# Patient Record
Sex: FEMALE | Race: WHITE
[De-identification: names, ages, dates, MRNs, and addresses within clinical notes are randomized per-mention and may not be internally consistent; named-entity substitution may affect disease eponyms.]

---

## 2017-06-24 ENCOUNTER — HOSPITAL ENCOUNTER (EMERGENCY)
Dept: HOSPITAL 45 - C.EDB | Age: 82
Discharge: HOME | End: 2017-06-24
Payer: COMMERCIAL

## 2017-06-24 VITALS
BODY MASS INDEX: 16.01 KG/M2 | HEIGHT: 57.99 IN | HEIGHT: 57.99 IN | WEIGHT: 76.28 LBS | WEIGHT: 76.28 LBS | BODY MASS INDEX: 16.01 KG/M2

## 2017-06-24 VITALS
TEMPERATURE: 98.24 F | DIASTOLIC BLOOD PRESSURE: 114 MMHG | SYSTOLIC BLOOD PRESSURE: 204 MMHG | OXYGEN SATURATION: 95 % | HEART RATE: 92 BPM

## 2017-06-24 DIAGNOSIS — S01.81XA: Primary | ICD-10-CM
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
## 2017-08-16 ENCOUNTER — HOSPITAL ENCOUNTER (OUTPATIENT)
Dept: HOSPITAL 45 - C.RDSM | Age: 82
Discharge: HOME | End: 2017-08-16
Attending: FAMILY MEDICINE
Payer: COMMERCIAL

## 2017-08-16 DIAGNOSIS — M85.811: ICD-10-CM

## 2017-08-16 DIAGNOSIS — S43.001A: ICD-10-CM

## 2017-08-16 DIAGNOSIS — M25.512: Primary | ICD-10-CM
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
## 2017-08-16 NOTE — DIAGNOSTIC IMAGING REPORT
RIGHT SHOULDER 3 VIEWS



CLINICAL HISTORY: Right shoulder pain.



FINDINGS: 3 views of the right shoulder are obtained. No prior studies are

available for comparison at the time of dictation. The skeletal structures are

osteopenic. No fracture is seen. Productive degenerative change with bony

overgrowth is seen at the acromioclavicular joint. There is arthritic change at

the glenohumeral articulation with superior subluxation of the humeral head.

Large spurs arise from the humeral head. Soft tissue swelling is noted overlying

the shoulder, greatest over the acromioclavicular joint. The partially imaged

right upper lobe lung parenchyma appears clear. There is atherosclerotic

calcification of the thoracic aorta. Surgical clips are noted in the neck.



IMPRESSION:



1. Osteopenia and arthritic change with no acute bony abnormality identified in

the right shoulder.



2. There is superior subluxation of the humeral head suggesting chronic rotator

cuff injury.



3. Productive degenerative change is seen at the acromioclavicular joint with

overlying soft tissue edema.







Electronically signed by:  Vamsi Jung M.D.

8/16/2017 10:01 AM



Dictated Date/Time:  8/16/2017 9:59 AM

## 2017-08-16 NOTE — DIAGNOSTIC IMAGING REPORT
LEFT SHOULDER MIN 2 VIEWS



CLINICAL HISTORY: Left shoulder pain  PALPABLE LUMP NEAR THE AC JOINT.



COMPARISON: None.



DISCUSSION: No acute fractures or dislocations are visualized. The bones are

osteopenic. There is minor spurring at the level of the greater tuberosity.

There there is a 2 cm lucency within the humeral head with sclerotic margins.

This has a nonaggressive appearance. Are surgical clips within the left neck.   





IMPRESSION: 

1. No fractures or dislocations identified.

2. 2 cm humeral head lucency with a nonaggressive appearance







Electronically signed by:  Sergio Burnett M.D.

8/16/2017 10:00 AM



Dictated Date/Time:  8/16/2017 9:57 AM

## 2018-08-10 ENCOUNTER — HOSPITAL ENCOUNTER (EMERGENCY)
Dept: HOSPITAL 45 - C.EDB | Age: 83
Discharge: HOME | End: 2018-08-10
Payer: COMMERCIAL

## 2018-08-10 VITALS
HEIGHT: 57.99 IN | BODY MASS INDEX: 15.27 KG/M2 | WEIGHT: 72.75 LBS | WEIGHT: 72.75 LBS | BODY MASS INDEX: 15.27 KG/M2 | HEIGHT: 57.99 IN

## 2018-08-10 VITALS — DIASTOLIC BLOOD PRESSURE: 95 MMHG | HEART RATE: 80 BPM | SYSTOLIC BLOOD PRESSURE: 166 MMHG | OXYGEN SATURATION: 98 %

## 2018-08-10 VITALS — TEMPERATURE: 98.6 F

## 2018-08-10 DIAGNOSIS — R03.0: ICD-10-CM

## 2018-08-10 DIAGNOSIS — R04.0: Primary | ICD-10-CM

## 2018-08-10 NOTE — EMERGENCY ROOM VISIT NOTE
ED Visit Note


First contact with patient:  21:32


I have seen and examined this patient with Hue Tinoco and generally agree with 

the treatment plan as discussed.


Problem List


Medical Problems:


(1) Arthritis


Status: Chronic  











Current/Historical Medications


No Active Prescriptions or Reported Meds





Allergies


Coded Allergies:  


     No Known Allergies (Unverified , 6/24/17)





Vital Signs











  Date Time  Temp Pulse Resp B/P (MAP) Pulse Ox O2 Delivery O2 Flow Rate FiO2


 


8/10/18 21:27 37.0 97 18 207/111 98 Room Air  











Departure Information


Prescriptions





No Active Prescriptions or Reported Meds





Referrals


Hong Lee M.D. (PCP)





Patient Instructions


My [facility]

## 2018-08-11 NOTE — EMERGENCY ROOM VISIT NOTE
History


First contact with patient:  21:32


Chief Complaint:  NOSE BLEED (MINOR)


Stated Complaint:  NOSE BLEED





History of Present Illness


The patient is a 83 year old female who presents to the Emergency Room with 

complaints of nosebleed for the past few hours.  Patient states she has tried 

Vaseline, lavender oil and vinegar to help stop the nosebleed.  Patient states 

in the past this has helped.  Patient denies injury to the area, headache, 

chest pain, dyspnea, weakness, lightheadedness, dizziness, bleeding problems.  

No other concerns per patient.  Patient states he is healthy, active and has no 

active medical problems.





Review of Systems


An 10 system review of systems was completed with positives and pertinent 

negatives listed in the HPI.





Past Medical/Surgical History


Medical Problems:


(1) Arthritis








Social History


Smoking Status:  Never Smoker


Alcohol Use:  none


Marital Status:  


Housing Status:  lives with significant other


Occupation Status:  retired





Current/Historical Medications


No Active Prescriptions or Reported Meds





Physical Exam


Vital Signs











  Date Time  Temp Pulse Resp B/P (MAP) Pulse Ox O2 Delivery O2 Flow Rate FiO2


 


8/10/18 22:40  80 18 166/95 98   


 


8/10/18 22:23    166/95    


 


8/10/18 21:27 37.0 97 18 207/111 98 Room Air  











Physical Exam


VITALS: Vitals are noted on the nurse's note and reviewed by myself.  Vital 

signs hypertensive.


GENERAL: Pleasant elderly female holding her nose, in no acute distress, 

nondiaphoretic, well-developed well-nourished.


SKIN: The skin was without rashes, erythema, edema, or bruising.  There is no 

tenting of the skin.  Capillary reflex less than 2 seconds.


HEAD: Normocephalic atraumatic.  


EARS: External auditory canals clear, tympanic membranes pearly gray without 

erythema or effusion bilaterally.


EYES: Pupils equal round and reactive to light and accommodation.  Conjunctivae 

without injection, sclerae without icterus.  Extraocular movements intact.  


NOSE: Patent, active bleeding from the right Kiesselbach plexus area, no 

bleeding in the left nare.  No septal hematoma.  No sinus tenderness.


MOUTH: Mucous membranes moist.   Pharynx without erythema or exudate.  Uvula 

midline.  Airway patent.  Tongue does not deviate.  


NECK: Supple without nuchal rigidity.  No lymphadenopathy.  No thyromegaly.  

Cervical spine is nontender.  No JVD.


HEART: Regular rate and rhythm 


LUNGS: Clear to auscultation bilaterally without wheezes, rales or rhonchi.  No 

retractions or accessory muscle use.


ABDOMEN: Positive bowel sounds x 4.  Normal tympanic percussion.  Soft, 

nontender, without masses or organomegaly.  Jo sign negative.  No guarding 

or rebound tenderness. No CVA tenderness


MUSCULOSKELETAL: No muscle atrophy, erythema, or edema noted.  


NEURO: Patient was alert and oriented to person place and time.  Normal 

sensation to light and sharp touch.     No focal neurological deficits.





Medical Decision & Procedures


Medications Administered











 Medications


  (Trade)  Dose


 Ordered  Sig/Chuckie


 Route  Start Time


 Stop Time Status Last Admin


Dose Admin


 


 Oxymetazoline HCl


  (Afrin 0.05%


 Nasal Spray)  1 sprays  NOW  ONCE


 NA  8/10/18 21:45


 8/10/18 21:46 DC 8/10/18 21:50


1 SPRAYS











ED Course


Prior records/ancillary studies reviewed.


Triage Nursing notes reviewed.


Additional history obtained from family.





The patient's history was concerning for epistaxis.





Differential diagnosis:





Etiologies such as anterior epistaxis, coagulopathy, traumatic injury, fracture

, septal hematoma, posterior epistaxis as well as other pathologies were 

entertained.





Physical examination findings:


As above. Anterior bleeding source.





ER treatment provided:


Direct pressure


Intranasal phenylephrine





On reassessment the patient felt better.





Diagnostics interpreted by me:


Deferred








This appears to be consistent with anterior epistaxis.  Hemostasis was 

achieved.  Patient was observed for over an hour no rebleeding.  She was 

counseled on her high blood pressure.  She is advised to follow-up with family 

care in a few days here in the ER sooner for recurrence of the bleeding, chest 

pain, dyspnea, lightheadedness, dizziness worsening signs or symptoms or as 

needed..  By the evaluation outlined above emergent etiologies such as 

coagulopathy, traumatic injury, fracture, septal hematoma, posterior epistaxis, 

as well as others were deemed relatively unlikely.  Patient was informed that 

if  the bleeding starts again to apply the Afrin and the nasal clamp and wait 

15 minutes.  If it does not stop then come to the ER.





The pt informed about the findings as listed above. All questions were answered 

and  pleased with the treatment. Return instructions were outlined and the 

patient was discharged in stable condition.











Referral:


The patient was referred to family doctor for a recheck of the current condition





Case reviewed with my attending





The chart was completed utilizing Dragon Speech voice recognition software.   

Grammatical errors, random word insertions, pronoun errors, and incomplete 

sentences are an occassional consequence of this system due to software 

limitations, ambient noise, and hardware issues.  Any formal questions or 

concerns about the content, text, or information contained within the body of 

this dictation should be directly addressed to the physician assistant for 

clarification.





Medical Decision


As above





Medication Reconcilliation


Current Medication List:  was personally reviewed by me





Blood Pressure Screening


Patient's blood pressure:  Elevated blood pressure


Blood pressure disposition:  Referred to PCP





Impression





 Primary Impression:  


 Anterior epistaxis





Departure Information


Dispostion


Home / Self-Care





Condition


GOOD





Prescriptions





No Active Prescriptions or Reported Meds





Forms


WORK / SCHOOL INSTRUCTIONS, HOME CARE DOCUMENTATION FORM,                      

                                          IMPORTANT VISIT INFORMATION





Patient Instructions


Nosebleeds - [facility], [facility]





Additional Instructions





Avoid scratching, rubbing, picking, or blowing your nose.





The drier your nasal passages the more likely they are to bleed.  The following 

two products are available over-the-counter at most drug stores/pharmacies.  





[place] nasal spray or similar generic saline spray to keep the nose moist 

3 to 4 times a day.





If bleeding recurs apply direct pressure for an uninterrupted 20 minutes.  On 

and off pressure is much less effective because it will disturb the clots that 

are forming.  If the bleeding is still a problem after 20 minutes or is so 

heavy despite the pressure return to the emergency department.





Continue current medications.





Your blood pressure was high today.  Monitor it and follow-up with family care.





Follow-up with your primary care physician in 2 to 3 days for a recheck of your 

current condition.